# Patient Record
Sex: FEMALE | Race: WHITE | Employment: FULL TIME | URBAN - METROPOLITAN AREA
[De-identification: names, ages, dates, MRNs, and addresses within clinical notes are randomized per-mention and may not be internally consistent; named-entity substitution may affect disease eponyms.]

---

## 2021-06-24 ENCOUNTER — OFFICE VISIT (OUTPATIENT)
Dept: PRIMARY CARE CLINIC | Age: 21
End: 2021-06-24
Payer: COMMERCIAL

## 2021-06-24 VITALS
TEMPERATURE: 97.1 F | SYSTOLIC BLOOD PRESSURE: 124 MMHG | HEIGHT: 68 IN | BODY MASS INDEX: 23.49 KG/M2 | OXYGEN SATURATION: 99 % | WEIGHT: 155 LBS | DIASTOLIC BLOOD PRESSURE: 86 MMHG | HEART RATE: 68 BPM

## 2021-06-24 DIAGNOSIS — J06.9 VIRAL URI WITH COUGH: ICD-10-CM

## 2021-06-24 DIAGNOSIS — J02.9 SORE THROAT: Primary | ICD-10-CM

## 2021-06-24 LAB — S PYO AG THROAT QL: NORMAL

## 2021-06-24 PROCEDURE — 87880 STREP A ASSAY W/OPTIC: CPT | Performed by: NURSE PRACTITIONER

## 2021-06-24 PROCEDURE — 99203 OFFICE O/P NEW LOW 30 MIN: CPT | Performed by: NURSE PRACTITIONER

## 2021-06-24 ASSESSMENT — PATIENT HEALTH QUESTIONNAIRE - PHQ9
SUM OF ALL RESPONSES TO PHQ QUESTIONS 1-9: 0
SUM OF ALL RESPONSES TO PHQ9 QUESTIONS 1 & 2: 0
2. FEELING DOWN, DEPRESSED OR HOPELESS: 0
1. LITTLE INTEREST OR PLEASURE IN DOING THINGS: 0
SUM OF ALL RESPONSES TO PHQ QUESTIONS 1-9: 0
SUM OF ALL RESPONSES TO PHQ QUESTIONS 1-9: 0

## 2021-06-24 ASSESSMENT — ENCOUNTER SYMPTOMS
SORE THROAT: 1
VOMITING: 0
SWOLLEN GLANDS: 1
COUGH: 1
ABDOMINAL PAIN: 0
NAUSEA: 1
CHANGE IN BOWEL HABIT: 0
VISUAL CHANGE: 0

## 2021-06-24 NOTE — LETTER
[place] [address]  Phone: [phone]  Fax: [phone]    EDITH Greenberg CNP        June 24, 2021     Patient: Perlita Soler   YOB: 2000   Date of Visit: 6/24/2021       To Whom It May Concern: It is my medical opinion that Perlita Soler may return to work on 6/24/2021. She states she did not feel well 6/23/2021. If you have any questions or concerns, please don't hesitate to call.     Sincerely,          EDITH Greenberg CNP

## 2021-06-24 NOTE — PATIENT INSTRUCTIONS
Follow up with family doctor in 1 week as needed. Return immediately if worse, new symptoms develop, symptoms persist or have any questions or concerns. Push fluids, keep hydrated  Cool mist humidifier bedside  Continue all medications as prescribed  May alternate tylenol/motrin over the counter for pain or fever, take per package instructions.

## 2021-06-24 NOTE — PROGRESS NOTES
[facility]  [address] [place] [address]  Dept: [phone]  Dept Fax: [phone]    Tiago Denny is a 21 y.o. female who presents to the urgent care today for her medical conditions/complaints as notedbelow. Tiago Denny is c/o of Pharyngitis (pt has been having sore throat and congerstion headache nausea )      HPI:     59-year-old female patient presents for complaints of sore throat mild congestion, headache and nausea. No concern for Covid. sister had cold, now better, pt exposed to her sister. Needs work note    Pharyngitis  This is a new problem. The current episode started in the past 7 days (x3d). The problem occurs intermittently. The problem has been unchanged. Associated symptoms include congestion, coughing, fatigue, headaches, myalgias, nausea, a sore throat, swollen glands and weakness. Pertinent negatives include no abdominal pain, anorexia, arthralgias, change in bowel habit, chest pain, chills, diaphoresis, fever, joint swelling, neck pain, numbness, rash, urinary symptoms, vertigo, visual change or vomiting. Nothing aggravates the symptoms. She has tried acetaminophen (benadryl) for the symptoms. The treatment provided no relief. No past medical history on file. No current outpatient medications on file. No current facility-administered medications for this visit. No Known Allergies    Subjective:      Review of Systems   Constitutional: Positive for fatigue. Negative for chills, diaphoresis and fever. HENT: Positive for congestion and sore throat. Respiratory: Positive for cough. Cardiovascular: Negative for chest pain. Gastrointestinal: Positive for nausea. Negative for abdominal pain, anorexia, change in bowel habit and vomiting. Musculoskeletal: Positive for myalgias. Negative for arthralgias, joint swelling and neck pain. Skin: Negative for rash. Neurological: Positive for weakness and headaches. Negative for vertigo and numbness. All other systems reviewed and are negative. 14 systems reviewed and negative except as listed in HPI. Objective:     Physical Exam  Vitals and nursing note reviewed. Constitutional:       General: She is not in acute distress. Appearance: Normal appearance. She is well-developed. She is not ill-appearing, toxic-appearing or diaphoretic. Comments: nontoxic   HENT:      Head: Normocephalic and atraumatic. Right Ear: Tympanic membrane and external ear normal.      Left Ear: Tympanic membrane and external ear normal.      Nose: Nose normal.      Mouth/Throat:      Pharynx: Posterior oropharyngeal erythema present. No oropharyngeal exudate. Comments: Pharynx injected  Bilateral tonsils enlarged and injected, no exudative patches  Uvula midline no edema  Handling oral secretions without difficulty  + pnd - thin white  Eyes:      General: No scleral icterus. Right eye: No discharge. Left eye: No discharge. Extraocular Movements: Extraocular movements intact. Conjunctiva/sclera: Conjunctivae normal.      Pupils: Pupils are equal, round, and reactive to light. Neck:      Thyroid: No thyromegaly. Trachea: No tracheal deviation. Cardiovascular:      Rate and Rhythm: Normal rate and regular rhythm. Pulses: Normal pulses. Heart sounds: Normal heart sounds. No murmur heard. Pulmonary:      Effort: Pulmonary effort is normal. No respiratory distress. Breath sounds: Normal breath sounds. No stridor. No wheezing, rhonchi or rales. Chest:      Chest wall: No tenderness. Abdominal:      General: Bowel sounds are normal. There is no distension. Palpations: Abdomen is soft. Tenderness: There is no abdominal tenderness. Musculoskeletal:         General: No tenderness or deformity. Normal range of motion. Cervical back: Normal range of motion and neck supple.       Comments: Ambulated to and from room, gait steady, moving all ext without difficulty   Lymphadenopathy:      Cervical: Cervical adenopathy ( + tender ronal ant cervical lymphadenopathy) present. Skin:     General: Skin is warm and dry. Capillary Refill: Capillary refill takes less than 2 seconds. Findings: No rash ( no rash to visible skin). Neurological:      General: No focal deficit present. Mental Status: She is alert and oriented to person, place, and time. Motor: No abnormal muscle tone. Coordination: Coordination normal.   Psychiatric:         Mood and Affect: Mood normal.         Behavior: Behavior normal.       /86 (Site: Left Upper Arm, Position: Sitting, Cuff Size: Large Adult)   Pulse 68   Temp 97.1 °F (36.2 °C) (Tympanic)   Ht 5' 8\" (1.727 m)   Wt 155 lb (70.3 kg)   SpO2 99%   Breastfeeding No   BMI 23.57 kg/m²     Assessment:       Diagnosis Orders   1. Sore throat  POCT rapid strep A   2. Viral URI with cough         Plan:     Results for POC orders placed in visit on 06/24/21   POCT rapid strep A   Result Value Ref Range    Strep A Ag None Detected None Detected     poct strep throat neg  Based on sx and duration, will tx as viral  Minimal otc tx  Reviewed over-the-counter treatments for symptom management. Reviewed over-the-counter treatments for symptom management. Return for Make an Appt. with your Primary Care in 1 week. No orders of the defined types were placed in this encounter. Patient given educational materials - see patient instructions. Discussed use, benefit, and side effects of prescribed medications. All patient questions answered. Pt voicedunderstanding.     Electronically signed by EDITH Car CNP on 6/24/2021 at 9:20 AM

## 2021-11-23 ENCOUNTER — HOSPITAL ENCOUNTER (OUTPATIENT)
Age: 21
Setting detail: SPECIMEN
Discharge: HOME | End: 2021-11-23

## 2021-11-23 ENCOUNTER — OFFICE VISIT (OUTPATIENT)
Dept: PRIMARY CARE CLINIC | Age: 21
End: 2021-11-23
Payer: COMMERCIAL

## 2021-11-23 VITALS
WEIGHT: 155 LBS | TEMPERATURE: 98.2 F | SYSTOLIC BLOOD PRESSURE: 107 MMHG | OXYGEN SATURATION: 98 % | BODY MASS INDEX: 23.49 KG/M2 | DIASTOLIC BLOOD PRESSURE: 76 MMHG | HEART RATE: 85 BPM | HEIGHT: 68 IN

## 2021-11-23 DIAGNOSIS — J02.9 SORE THROAT: ICD-10-CM

## 2021-11-23 DIAGNOSIS — J06.9 VIRAL URI: Primary | ICD-10-CM

## 2021-11-23 LAB — S PYO AG THROAT QL: NORMAL

## 2021-11-23 PROCEDURE — 87880 STREP A ASSAY W/OPTIC: CPT | Performed by: NURSE PRACTITIONER

## 2021-11-23 PROCEDURE — 99213 OFFICE O/P EST LOW 20 MIN: CPT | Performed by: NURSE PRACTITIONER

## 2021-11-23 RX ORDER — PREDNISONE 20 MG/1
40 TABLET ORAL DAILY
Qty: 10 TABLET | Refills: 0 | Status: SHIPPED | OUTPATIENT
Start: 2021-11-23 | End: 2021-11-28

## 2021-11-23 ASSESSMENT — ENCOUNTER SYMPTOMS
SORE THROAT: 1
SHORTNESS OF BREATH: 0
CHEST TIGHTNESS: 0
COUGH: 0
EYE DISCHARGE: 0
EYE REDNESS: 0
VOICE CHANGE: 0
SINUS PRESSURE: 0
WHEEZING: 0

## 2021-11-23 NOTE — PATIENT INSTRUCTIONS

## 2021-11-23 NOTE — PROGRESS NOTES
[facility] [address] [facility]  [address]  [place] [address]  Dept: [phone]  Dept Fax: [phone]     Angela Arita is a 21 y.o. female who presents to the urgent care today for her medicalconditions/complaints as noted below. Angela Arita is c/o of Concern For COVID-19 (congestion, headache since this morning )    HPI:      Sinusitis  This is a new problem. The current episode started today. The problem is unchanged. There has been no fever. Associated symptoms include congestion, headaches and a sore throat. Pertinent negatives include no chills, coughing, ear pain, shortness of breath, sinus pressure or sneezing. Past treatments include nothing. The treatment provided no relief. No past medical history on file. Current Outpatient Medications   Medication Sig Dispense Refill    predniSONE (DELTASONE) 20 MG tablet Take 2 tablets by mouth daily for 5 days 10 tablet 0     No current facility-administered medications for this visit. No Known Allergies    Subjective:      Review of Systems   Constitutional: Negative for chills, fatigue and fever. HENT: Positive for congestion, postnasal drip and sore throat. Negative for ear discharge, ear pain, sinus pressure, sneezing and voice change. Eyes: Negative for discharge and redness. Respiratory: Negative for cough, chest tightness, shortness of breath and wheezing. Cardiovascular: Negative. Negative for chest pain. Musculoskeletal: Negative for myalgias. Skin: Negative for rash. Neurological: Positive for headaches. Negative for dizziness, weakness and light-headedness. Hematological: Negative for adenopathy. All other systems reviewed and are negative. Objective:      Physical Exam  Vitals and nursing note reviewed. Constitutional:       General: She is not in acute distress. Appearance: Normal appearance. She is well-developed. She is not ill-appearing, toxic-appearing or diaphoretic. HENT:      Head: Normocephalic. Right Ear: Tympanic membrane and external ear normal.      Left Ear: Tympanic membrane and external ear normal.      Nose: Nose normal.      Right Sinus: No maxillary sinus tenderness or frontal sinus tenderness. Left Sinus: No maxillary sinus tenderness or frontal sinus tenderness. Mouth/Throat:      Pharynx: Posterior oropharyngeal erythema present. No oropharyngeal exudate. Eyes:      General:         Right eye: No discharge. Left eye: No discharge. Cardiovascular:      Rate and Rhythm: Normal rate and regular rhythm. Heart sounds: Normal heart sounds. No murmur heard. Pulmonary:      Effort: Pulmonary effort is normal. No respiratory distress. Breath sounds: Normal breath sounds. No wheezing or rales. Lymphadenopathy:      Cervical: Cervical adenopathy present. Skin:     General: Skin is warm. Findings: No rash. Neurological:      Mental Status: She is alert. /76 (Site: Left Upper Arm, Position: Sitting, Cuff Size: Large Adult)   Pulse 85   Temp 98.2 °F (36.8 °C) (Infrared)   Ht 5' 8\" (1.727 m)   Wt 155 lb (70.3 kg)   SpO2 98%   BMI 23.57 kg/m²     - rapid strep    Assessment:       Diagnosis Orders   1. Viral URI  COVID-19    predniSONE (DELTASONE) 20 MG tablet   2. Sore throat  POCT rapid strep A     Plan: Will send out COVID19 testing. Possible treatment alterations based on the results. Prednisone sent to the pharmacy to help enable symptom relief. Patient instructed to self-quarantine until testing results are back. Patient instructed not to return to work until results are back. Tylenol as needed for fever/pain. Encouraged adequate hydration and rest.  The patient indicates understanding of these issues and agrees with the plan. Educational materials provided on AVS.  Follow up if symptoms do not improve/worsen. Discussed symptoms that will warrant urgent ED evaluation/treatment.     Orders Placed This Encounter   Medications    predniSONE (DELTASONE) 20 MG tablet     Sig: Take 2 tablets by mouth daily for 5 days     Dispense:  10 tablet     Refill:  0        Patient given educational materials - see patient instructions. Discussed use, benefit, and side effects of prescribed medications. All patientquestions answered. Pt voiced understanding.     Electronically signed by EDITH Conde CNP on 11/23/2021at 4:55 PM

## 2021-11-23 NOTE — LETTER
[facility]  [place] [address] [place] [address] [address] [address]  Phone: [phone]  Fax: [phone]    EDITH Orozco CNP        November 23, 2021     Patient: Murphy Sanders   YOB: 2000   Date of Visit: 11/23/2021       To Whom it May Concern:    Murphy Sanders was seen in my clinic on 11/23/2021. Please excuse her absence, she is currently awaiting COVID-19 testing results. If you have any questions or concerns, please don't hesitate to call.     Sincerely,         EDITH Orozco CNP

## 2021-11-24 DIAGNOSIS — J06.9 VIRAL URI: ICD-10-CM

## 2021-11-24 LAB
SARS-COV-2: NORMAL
SARS-COV-2: NOT DETECTED
SOURCE: NORMAL

## 2023-03-22 ENCOUNTER — OFFICE VISIT (OUTPATIENT)
Dept: PRIMARY CARE CLINIC | Age: 23
End: 2023-03-22
Payer: COMMERCIAL

## 2023-03-22 VITALS
WEIGHT: 155 LBS | DIASTOLIC BLOOD PRESSURE: 80 MMHG | HEART RATE: 92 BPM | OXYGEN SATURATION: 97 % | SYSTOLIC BLOOD PRESSURE: 139 MMHG | BODY MASS INDEX: 23.57 KG/M2 | TEMPERATURE: 98.6 F

## 2023-03-22 DIAGNOSIS — J06.9 URI WITH COUGH AND CONGESTION: Primary | ICD-10-CM

## 2023-03-22 DIAGNOSIS — J02.9 SORE THROAT: ICD-10-CM

## 2023-03-22 LAB — S PYO AG THROAT QL: NORMAL

## 2023-03-22 PROCEDURE — 87880 STREP A ASSAY W/OPTIC: CPT

## 2023-03-22 PROCEDURE — 99213 OFFICE O/P EST LOW 20 MIN: CPT

## 2023-03-22 RX ORDER — PREDNISONE 20 MG/1
40 TABLET ORAL DAILY
Qty: 10 TABLET | Refills: 0 | Status: SHIPPED | OUTPATIENT
Start: 2023-03-22 | End: 2023-03-27

## 2023-03-22 RX ORDER — BENZONATATE 100 MG/1
100 CAPSULE ORAL 3 TIMES DAILY PRN
Qty: 21 CAPSULE | Refills: 0 | Status: SHIPPED | OUTPATIENT
Start: 2023-03-22 | End: 2023-03-29

## 2023-03-22 ASSESSMENT — ENCOUNTER SYMPTOMS
VISUAL CHANGE: 0
WHEEZING: 0
PHOTOPHOBIA: 0
EYE DISCHARGE: 0
BACK PAIN: 0
VOMITING: 0
COUGH: 1
ABDOMINAL PAIN: 0
CHEST TIGHTNESS: 0
BLOOD IN STOOL: 0
CHOKING: 0
GASTROINTESTINAL NEGATIVE: 1
NAUSEA: 0
ABDOMINAL DISTENTION: 0
DIARRHEA: 0
RHINORRHEA: 0
SHORTNESS OF BREATH: 0
CHANGE IN BOWEL HABIT: 0
EYES NEGATIVE: 1
SWOLLEN GLANDS: 0
FACIAL SWELLING: 0
ANAL BLEEDING: 0
TROUBLE SWALLOWING: 0
SINUS PAIN: 0
RECTAL PAIN: 0
CONSTIPATION: 0
VOICE CHANGE: 0
EYE PAIN: 0
EYE ITCHING: 0
STRIDOR: 0
SORE THROAT: 1
EYE REDNESS: 0
SINUS PRESSURE: 0
APNEA: 0
COLOR CHANGE: 0

## 2023-03-22 NOTE — LETTER
March 22, 2023       José Miguel Milton YOB: 2000   [place] [address] Date of Visit:  3/22/2023       To Whom It May Concern: It is my medical opinion that José Miguel Milton be excused 3/23/2023 due to medical reasons. If you have any questions or concerns, please don't hesitate to call.     Sincerely,        Alissa Flores, APRN - CNP

## 2023-03-22 NOTE — PROGRESS NOTES
benzonatate (TESSALON PERLES) 100 MG capsule     Sig: Take 1 capsule by mouth 3 times daily as needed for Cough     Dispense:  21 capsule     Refill:  0             I believe that this is likely a viral illness based on the physical exam findings. Tylenol/Motrin for fever/discomfort. Patient agreeable to treatment plan. Educational materials provided on AVS.  Follow up if symptoms do not improve/worsen. Patient and/or parent given educational materials - see patient instructions. Discussed use, benefit, and side effects of prescribed medications. All patient questions answered. Patient and/or parent voiced understanding.       Electronically signed by EDITH Franco 3/22/2023 at 12:38 PM